# Patient Record
Sex: FEMALE | Race: WHITE | NOT HISPANIC OR LATINO | Employment: PART TIME | URBAN - METROPOLITAN AREA
[De-identification: names, ages, dates, MRNs, and addresses within clinical notes are randomized per-mention and may not be internally consistent; named-entity substitution may affect disease eponyms.]

---

## 2020-10-16 LAB — EXTERNAL CHLAMYDIA RESULT: NOT DETECTED

## 2021-11-19 ENCOUNTER — TELEPHONE (OUTPATIENT)
Dept: OBGYN CLINIC | Facility: CLINIC | Age: 26
End: 2021-11-19

## 2021-11-19 DIAGNOSIS — Z30.41 SURVEILLANCE FOR BIRTH CONTROL, ORAL CONTRACEPTIVES: Primary | ICD-10-CM

## 2021-11-21 RX ORDER — NORETHINDRONE ACETATE AND ETHINYL ESTRADIOL 1MG-20(21)
1 KIT ORAL DAILY
Qty: 90 TABLET | Refills: 0 | Status: SHIPPED | OUTPATIENT
Start: 2021-11-21 | End: 2021-11-22

## 2021-11-22 DIAGNOSIS — Z30.41 SURVEILLANCE FOR BIRTH CONTROL, ORAL CONTRACEPTIVES: ICD-10-CM

## 2021-11-22 RX ORDER — NORETHINDRONE ACETATE/ETHINYL ESTRADIOL AND FERROUS FUMARATE 1MG-20(24)
1 KIT ORAL DAILY
Qty: 90 TABLET | Refills: 1 | Status: SHIPPED | OUTPATIENT
Start: 2021-11-22 | End: 2022-01-24 | Stop reason: SDUPTHER

## 2021-12-15 ENCOUNTER — TELEPHONE (OUTPATIENT)
Dept: OBGYN CLINIC | Facility: CLINIC | Age: 26
End: 2021-12-15

## 2022-01-17 ENCOUNTER — VBI (OUTPATIENT)
Dept: ADMINISTRATIVE | Facility: OTHER | Age: 27
End: 2022-01-17

## 2022-01-18 NOTE — TELEPHONE ENCOUNTER
Upon review of the In Basket request we were able to locate, review, and update the patient chart as requested for Chlamydia and Pap Smear (HPV) aka Cervical Cancer Screening  Any additional questions or concerns should be emailed to the Practice Liaisons via [email or web address] email, please do not reply via In Basket      Thank you  Yanet Rogers

## 2022-01-24 ENCOUNTER — ANNUAL EXAM (OUTPATIENT)
Dept: OBGYN CLINIC | Facility: CLINIC | Age: 27
End: 2022-01-24
Payer: COMMERCIAL

## 2022-01-24 VITALS
BODY MASS INDEX: 36.18 KG/M2 | DIASTOLIC BLOOD PRESSURE: 60 MMHG | HEIGHT: 63 IN | WEIGHT: 204.2 LBS | SYSTOLIC BLOOD PRESSURE: 94 MMHG

## 2022-01-24 DIAGNOSIS — F32.A DEPRESSION, UNSPECIFIED DEPRESSION TYPE: ICD-10-CM

## 2022-01-24 DIAGNOSIS — Z30.41 SURVEILLANCE FOR BIRTH CONTROL, ORAL CONTRACEPTIVES: ICD-10-CM

## 2022-01-24 DIAGNOSIS — Z11.3 SCREEN FOR STD (SEXUALLY TRANSMITTED DISEASE): ICD-10-CM

## 2022-01-24 DIAGNOSIS — N80.9 ENDOMETRIOSIS: ICD-10-CM

## 2022-01-24 DIAGNOSIS — Z01.419 ENCOUNTER FOR GYNECOLOGICAL EXAMINATION WITHOUT ABNORMAL FINDING: Primary | ICD-10-CM

## 2022-01-24 DIAGNOSIS — Z12.4 CERVICAL CANCER SCREENING: ICD-10-CM

## 2022-01-24 DIAGNOSIS — Z30.09 CONTRACEPTIVE EDUCATION: ICD-10-CM

## 2022-01-24 PROCEDURE — S0612 ANNUAL GYNECOLOGICAL EXAMINA: HCPCS | Performed by: OBSTETRICS & GYNECOLOGY

## 2022-01-24 RX ORDER — LETROZOLE 2.5 MG/1
2.5 TABLET, FILM COATED ORAL DAILY
Qty: 90 TABLET | Refills: 3 | Status: SHIPPED | OUTPATIENT
Start: 2022-01-24 | End: 2023-01-24

## 2022-01-24 RX ORDER — ACETAMINOPHEN AND CODEINE PHOSPHATE 120; 12 MG/5ML; MG/5ML
SOLUTION ORAL DAILY
COMMUNITY

## 2022-01-24 RX ORDER — LETROZOLE 2.5 MG/1
TABLET, FILM COATED ORAL
COMMUNITY
Start: 2021-10-28 | End: 2022-01-24 | Stop reason: SDUPTHER

## 2022-01-24 RX ORDER — FLUTICASONE FUROATE 27.5 UG/1
2 SPRAY, METERED NASAL
COMMUNITY

## 2022-01-24 RX ORDER — NORETHINDRONE ACETATE/ETHINYL ESTRADIOL AND FERROUS FUMARATE 1MG-20(24)
1 KIT ORAL DAILY
Qty: 90 TABLET | Refills: 3 | Status: SHIPPED | OUTPATIENT
Start: 2022-01-24 | End: 2023-01-24

## 2022-01-24 RX ORDER — HYDROXYZINE PAMOATE 50 MG/1
50-100 CAPSULE ORAL
COMMUNITY

## 2022-01-24 RX ORDER — NORETHINDRONE ACETATE/ETHINYL ESTRADIOL AND FERROUS FUMARATE 1MG-20(24)
1 KIT ORAL DAILY
Qty: 90 TABLET | Refills: 3 | Status: SHIPPED | OUTPATIENT
Start: 2022-01-24 | End: 2022-01-24

## 2022-01-24 RX ORDER — SPIRONOLACTONE 100 MG/1
TABLET, FILM COATED ORAL
COMMUNITY
Start: 2021-11-04

## 2022-01-24 RX ORDER — LURASIDONE HYDROCHLORIDE 40 MG/1
TABLET, FILM COATED ORAL
COMMUNITY
Start: 2021-11-20

## 2022-01-24 NOTE — PROGRESS NOTES
[id number], [address], [facility], [address] [facility]    ASSESSMENT/PLAN: Elizabeth Shea is a 27 y o  Jose Headley who presents for annual gynecologic exam     Encounter for routine gynecologic examination  - Routine well woman exam completed today  - covid  +  reports     - STI screening offered including HIV testing: culture    - Contraceptive counseling discussed  Current contraception: condoms or combination OCPs:     Additional problems addressed during this visit:  1  Encounter for gynecological examination without abnormal finding    2  Surveillance for birth control, oral contraceptives    3  Endometriosis    4  Screen for STD (sexually transmitted disease)    5  Contraceptive education    6  Depression, unspecified depression type    26 yo  here for wellness exam  + hx of depression   Doing okay  Referred to  [facility] for  Counseling  Primary orders  Meds  No period only spots for  3-4  D  When stops  continuous pills  + hx  Of  Endometriosis  Uses latuda  Pain if does not  Denies ACHES  Pt to stop pill for  3-4  D for  BTB  No current partner  Encouraged wt loss and  Exercise   Busy at work and let's self go  Some pain w  Relations   CC:  Annual Gynecologic Examination    HPI: Elizabeth Shea is a 27 y o  Jose Headley who presents for annual gynecologic examination  26 yo here for  Wellness exam   + hx of  Depression  No feeling s of  Hurting  Self or others  Needs counselor    Referred to   [facility]    + hx of endometriosis   Manages   w  Letrozole and  OCP       The following portions of the patient's history were reviewed and updated as appropriate: She  has a past medical history of Depression and Endometriosis  She  has a past surgical history that includes Cholecystectomy (2018)    Her family history includes Breast cancer in her paternal grandmother; Diabetes in her father and maternal grandmother; Heart disease in her father; Hyperlipidemia in her father  She  reports that she has never smoked  She has never used smokeless tobacco  She reports current alcohol use  She reports current drug use  Frequency: 7 00 times per week  Drug: Marijuana  Current Outpatient Medications   Medication Sig Dispense Refill    fluticasone (Flonase Sensimist) 27 5 MCG/SPRAY nasal spray 2 sprays into each nostril      hydrOXYzine pamoate (VISTARIL) 50 mg capsule Take  mg by mouth      Latuda 40 MG tablet       letrozole (FEMARA) 2 5 mg tablet       norethindrone (MICRONOR) 0 35 MG tablet Take by mouth daily      norethindrone-ethinyl estradiol-ferrous fumarate (Jed 24 FE) 1-20 MG-MCG(24) per tablet Take 1 tablet by mouth daily 90 tablet 1    sertraline (ZOLOFT) 50 mg tablet       spironolactone (ALDACTONE) 100 mg tablet        No current facility-administered medications for this visit  She is allergic to ondansetron, bupropion, covid-19 (mrna) vaccine, doxycycline, iodinated diagnostic agents, lactose - food allergy, penicillins, covid-19 (adenovirus) vaccine, gadolinium, and latex       Review of Systems   Constitutional: Negative for chills and fever  HENT: Negative for ear pain and sore throat  Eyes: Negative for pain and visual disturbance  Respiratory: Negative for cough and shortness of breath  Cardiovascular: Negative for chest pain and palpitations  Gastrointestinal: Negative for abdominal pain and vomiting  Genitourinary: Positive for dyspareunia  Negative for difficulty urinating, dysuria, hematuria, menstrual problem, pelvic pain, vaginal bleeding, vaginal discharge and vaginal pain  Musculoskeletal: Negative for arthralgias and back pain  Skin: Negative for color change and rash  Neurological: Negative  Negative for seizures and syncope  Hematological: Negative  Psychiatric/Behavioral: Negative  All other systems reviewed and are negative          Objective:  BP 94/60 (BP Location: Left arm, Patient Position: Sitting, Cuff Size: Standard)   Ht 5' 3" (1 6 m)   Wt 92 6 kg (204 lb 3 2 oz)   LMP 10/01/2021 (Approximate) Comment: Does not take placebo week in her pack, but every three to four months, she still gets a period  BMI 36 17 kg/m²    Physical Exam  Vitals and nursing note reviewed  Constitutional:       Appearance: Normal appearance  HENT:      Head: Normocephalic  Cardiovascular:      Rate and Rhythm: Normal rate and regular rhythm  Pulses: Normal pulses  Heart sounds: Normal heart sounds  Pulmonary:      Effort: Pulmonary effort is normal       Breath sounds: Normal breath sounds  Chest:      Chest wall: No mass, lacerations, swelling, tenderness or edema  Breasts: Reji Score is 4  Breasts are symmetrical       Right: Normal  No swelling, bleeding, inverted nipple, mass, nipple discharge, skin change, tenderness, axillary adenopathy or supraclavicular adenopathy  Left: No swelling, bleeding, inverted nipple, mass, nipple discharge, skin change, tenderness, axillary adenopathy or supraclavicular adenopathy  Abdominal:      General: Abdomen is flat  Bowel sounds are normal       Palpations: Abdomen is soft  Genitourinary:     General: Normal vulva  Exam position: Lithotomy position  Pubic Area: No rash  Reji stage (genital): 4       Labia:         Right: No rash, tenderness or lesion  Left: No rash, tenderness or lesion  Urethra: No urethral pain, urethral swelling or urethral lesion  Vagina: Normal       Cervix: No cervical motion tenderness or discharge  Uterus: Normal        Adnexa: Right adnexa normal and left adnexa normal       Rectum: Normal    Musculoskeletal:         General: Normal range of motion  Cervical back: Neck supple  Lymphadenopathy:      Upper Body:      Right upper body: No supraclavicular, axillary or pectoral adenopathy        Left upper body: No supraclavicular, axillary or pectoral adenopathy  Lower Body: No right inguinal adenopathy  No left inguinal adenopathy  Skin:     General: Skin is warm and dry  Neurological:      General: No focal deficit present  Mental Status: She is alert and oriented to person, place, and time  Mental status is at baseline     Psychiatric:         Mood and Affect: Mood normal          Behavior: Behavior normal

## 2022-01-24 NOTE — PATIENT INSTRUCTIONS
Pap every 3 years if normal, STI testing as indicated, exercise most days of week, obtain appropriate diet and hydration, Calcium 1000mg + 600 vit D daily, birth control as directed (ACHES reviewed)  Benefits, risks and alternatives discussed/reviewed  Condom use when sexually active for sexually transmitted infection prevention  HPV 9 vaccine recommended through age 39  Check with your insurance for coverage  If covered, call office to schedule start of vaccine series  Annual mammogram starting at age 36, monthly breast self exam  Jose Miguel Moya   at red light or at least  20 times a day

## 2022-01-30 LAB
C TRACH RRNA CVX QL NAA+PROBE: NEGATIVE
CYTOLOGIST CVX/VAG CYTO: NORMAL
DX ICD CODE: NORMAL
N GONORRHOEA RRNA CVX QL NAA+PROBE: NEGATIVE
OTHER STN SPEC: NORMAL
OTHER STN SPEC: NORMAL
PATH REPORT.FINAL DX SPEC: NORMAL
SL AMB NOTE:: NORMAL
SL AMB SPECIMEN ADEQUACY: NORMAL
SL AMB TEST METHODOLOGY: NORMAL

## 2024-06-15 DIAGNOSIS — Z00.6 ENCOUNTER FOR EXAMINATION FOR NORMAL COMPARISON OR CONTROL IN CLINICAL RESEARCH PROGRAM: ICD-10-CM

## 2024-09-05 ENCOUNTER — TELEPHONE (OUTPATIENT)
Dept: CARDIOLOGY CLINIC | Facility: CLINIC | Age: 29
End: 2024-09-05

## 2024-09-05 ENCOUNTER — CONSULT (OUTPATIENT)
Dept: CARDIOLOGY CLINIC | Facility: CLINIC | Age: 29
End: 2024-09-05
Payer: COMMERCIAL

## 2024-09-05 VITALS
WEIGHT: 224 LBS | SYSTOLIC BLOOD PRESSURE: 114 MMHG | BODY MASS INDEX: 39.69 KG/M2 | RESPIRATION RATE: 16 BRPM | OXYGEN SATURATION: 99 % | HEIGHT: 63 IN | DIASTOLIC BLOOD PRESSURE: 74 MMHG | HEART RATE: 97 BPM

## 2024-09-05 DIAGNOSIS — R07.89 CHEST DISCOMFORT: Primary | ICD-10-CM

## 2024-09-05 DIAGNOSIS — R55 PRE-SYNCOPE: ICD-10-CM

## 2024-09-05 PROCEDURE — 99204 OFFICE O/P NEW MOD 45 MIN: CPT | Performed by: INTERNAL MEDICINE

## 2024-09-05 PROCEDURE — 93000 ELECTROCARDIOGRAM COMPLETE: CPT | Performed by: INTERNAL MEDICINE

## 2024-09-05 RX ORDER — LITHIUM CARBONATE 300 MG/1
300 CAPSULE ORAL
COMMUNITY

## 2024-09-05 RX ORDER — PRAZOSIN HYDROCHLORIDE 2 MG/1
2 CAPSULE ORAL
COMMUNITY

## 2024-09-05 NOTE — TELEPHONE ENCOUNTER
SPOKE TO PT'S INSUR CO & WE ARE IN PAR W/ PT'S PLAN & NO REFERRAL IS NEEDED  REF # OF THE CALL IS: [id number]

## 2024-09-05 NOTE — PROGRESS NOTES
Cardiology Consultation     Annika Henderson  [id number]  1995  [address] [facility]  [facility] [place] [address]  This patient presents for cardiac evaluation and consultation.  Patient was feeling well until her father  last year and subsequently she was dealing with the psychological aspects of the same.  Patient is followed by psychiatry.    Patient was evaluated by cardiologist in a different facility for possible POTS but no conclusive plan of treatment was formulated.    Patient does complain of dizzy lightheaded and becomes presyncopal.  No episodes of annamarie syncope in the recent past.  Patient has had syncope in the past.  Patient denies any history of cardiac problems in the past.  Patient denies any history of rheumatic fever or heart murmurs.  Patient has been on lithium as well as prazosin for nightmares.    Patient has been trying to keep herself hydrated and has liberal salt intake.  Patient does have some palpitations.  At times patient when she is standing for a long time becomes short of breath sweaty and lightheaded.      1. Chest discomfort  Echo complete w/ contrast if indicated    POCT ECG      2. Pre-syncope  Tilt table    POCT ECG          Past Medical History:   Diagnosis Date    Depression     Endometriosis      Social History     Socioeconomic History    Marital status: Single     Spouse name: Not on file    Number of children: Not on file    Years of education: Not on file    Highest education level: Not on file   Occupational History    Not on file   Tobacco Use    Smoking status: Never    Smokeless tobacco: Never   Vaping Use    Vaping status: Never Used   Substance and Sexual Activity    Alcohol use: Yes     Comment: occ    Drug use: Yes     Frequency: 7.0 times per week     Types: Marijuana     Comment: with medical card    Sexual activity: Yes     Birth control/protection:  "OCP, Pill     Comment: in the past year   Other Topics Concern    Not on file   Social History Narrative    Exercise: Never    Domestic violence: No    Current illegal drug use Yes         Social Determinants of Health     Financial Resource Strain: Not on file   Food Insecurity: Not on file   Transportation Needs: Not on file   Physical Activity: Not on file   Stress: Not on file   Social Connections: Not on file   Intimate Partner Violence: Not on file   Housing Stability: Not on file      Family History   Problem Relation Age of Onset    Diabetes Father     Heart disease Father     Hyperlipidemia Father     Diabetes Maternal Grandmother     Breast cancer Paternal Grandmother      Past Surgical History:   Procedure Laterality Date    CHOLECYSTECTOMY  2018       Current Outpatient Medications:     Blisovi 24 Fe 1-20 MG-MCG(24) per tablet, TAKE 1 TABLET BY MOUTH EVERY DAY, Disp: 84 tablet, Rfl: 3    CYPROHEPTADINE HCL PO, Take 1 tablet by mouth in the morning, Disp: , Rfl:     fluticasone (Flonase Sensimist) 27.5 MCG/SPRAY nasal spray, 2 sprays into each nostril, Disp: , Rfl:     lithium carbonate 300 mg capsule, Take 300 mg by mouth 3 (three) times a day with meals, Disp: , Rfl:     prazosin (MINIPRESS) 2 mg capsule, Take 2 mg by mouth daily at bedtime, Disp: , Rfl:   Allergies   Allergen Reactions    Ondansetron Anaphylaxis    Bupropion Seizures     Other reaction(s): seizure      Covid-19 (Mrna) Vaccine Hives    Doxycycline Other (See Comments)     Loss of vision      Iodinated Contrast Media Other (See Comments)    Lactose - Food Allergy GI Intolerance    Penicillins Other (See Comments)     Mom is allergic      Covid-19 (Adenovirus) Vaccine Other (See Comments)    Gadolinium Other (See Comments)    Latex Rash     Vitals:    09/05/24 1352   BP: 114/74   BP Location: Left arm   Patient Position: Sitting   Cuff Size: Large   Pulse: 97   Resp: 16   SpO2: 99%   Weight: 102 kg (224 lb)   Height: 5' 3\" (1.6 m) "       Labs:  No visits with results within 2 Month(s) from this visit.   Latest known visit with results is:   Annual Exam on 01/24/2022   Component Date Value    Diagnosis: 01/24/2022 Comment     Specimen Adequacy 01/24/2022 Comment     Clinician Provided ICD10 01/24/2022 Comment     Performed by: 01/24/2022 Comment     KIERSTEN FINNEY . 01/24/2022 .     Note: 01/24/2022 Comment     Test Methodology: 01/24/2022 Comment     KIERSTEN AMB . 01/24/2022 Comment     Chlamydia, Nuc. Acid Amp 01/24/2022 Negative     Gonococcus bY Nucleic Ac* 01/24/2022 Negative      Imaging: No results found.    Review of Systems:  Review of Systems  REVIEW OF SYSTEMS:  Constitutional:  Denies fever or chills   Eyes:  Denies change in visual acuity   HENT:  Denies nasal congestion or sore throat   Respiratory:  shortness of breath   Cardiovascular:  Denies chest pain or edema   GI:  Denies abdominal pain, nausea, vomiting, bloody stools or diarrhea   :  Denies dysuria, frequency, difficulty in micturition and nocturia  Musculoskeletal:  Denies back pain or joint pain   Neurologic:  Denies headache, focal weakness or sensory changes   Endocrine:  Denies polyuria or polydipsia   Lymphatic:  Denies swollen glands       Physical Exam:  Physical Exam  PHYSICAL EXAM:  General:  Patient is not in acute distress   Head: Normocephalic, Atraumatic.  HEENT:  Both pupils normal-size atraumatic, normocephalic, nonicteric  Neck:  JVP not raised. Trachea central. No carotid bruit  Respiratory:  normal breath sounds no crackles. no rhonchi  Cardiovascular:  Regular rate and rhythm no S3 no murmurs  GI:  Abdomen soft nontender. No organomegaly.   Lymphatic:  No cervical or inguinal lymphadenopathy  Neurologic:  Patient is awake alert, oriented . Grossly nonfocal  Extremities no edema    EKG shows sinus rhythm and possible left atrial enlargement    Discussion/Summary:    This patient has symptoms of orthostasis and presyncope.  Patient concerned about POTS.    Lengthy  discussion with patient regarding her symptomatology and etiology and pathogenesis of orthostasis.    Importance of adequate hydration, liberal salt intake as well as using compression stocking discussed with patient.    Orthostatics reviewed  Lying /70 heart rate 92  Sitting /74 heart rate 110  Standing BP 92/68 heart rate 107    Patient will be scheduled for tilt table test for further evaluation.  Patient states that she had a Holter monitor as well as EKG and stress test through her previous cardiologist.    Patient will be scheduled for an echocardiogram to assess ejection fraction and rule out evidence of structural heart disease.    Possible treatment options for her symptoms were considered but at this time we will try nonpharmacological measures and wait for tilt table test and echocardiogram results.    Patient also encouraged to check with her psychiatrist regarding use of prazosin which is a vasodilator and can cause orthostasis and low blood pressures.    Follow-up in 6 months or earlier as needed.  Patient is agreeable with the plan of care.

## 2024-09-09 ENCOUNTER — APPOINTMENT (OUTPATIENT)
Dept: LAB | Facility: HOSPITAL | Age: 29
End: 2024-09-09
Payer: COMMERCIAL

## 2024-09-09 DIAGNOSIS — Z00.6 ENCOUNTER FOR EXAMINATION FOR NORMAL COMPARISON OR CONTROL IN CLINICAL RESEARCH PROGRAM: ICD-10-CM

## 2024-09-09 PROCEDURE — 36415 COLL VENOUS BLD VENIPUNCTURE: CPT

## 2024-09-20 ENCOUNTER — HOSPITAL ENCOUNTER (OUTPATIENT)
Dept: NON INVASIVE DIAGNOSTICS | Facility: HOSPITAL | Age: 29
Discharge: HOME | End: 2024-09-20
Payer: COMMERCIAL

## 2024-09-20 VITALS
DIASTOLIC BLOOD PRESSURE: 74 MMHG | HEART RATE: 75 BPM | HEIGHT: 63 IN | WEIGHT: 224 LBS | BODY MASS INDEX: 39.69 KG/M2 | SYSTOLIC BLOOD PRESSURE: 114 MMHG

## 2024-09-20 DIAGNOSIS — R07.89 CHEST DISCOMFORT: ICD-10-CM

## 2024-09-20 LAB
AORTIC ROOT: 3.1 CM
APICAL FOUR CHAMBER EJECTION FRACTION: 62 %
ASCENDING AORTA: 2.5 CM
BSA FOR ECHO PROCEDURE: 2.03 M2
E WAVE DECELERATION TIME: 161 MS
E/A RATIO: 1.3
FRACTIONAL SHORTENING: 32 (ref 28–44)
INTERVENTRICULAR SEPTUM IN DIASTOLE (PARASTERNAL SHORT AXIS VIEW): 0.8 CM
INTERVENTRICULAR SEPTUM: 0.8 CM (ref 0.6–1.1)
LAAS-AP2: 10.2 CM2
LAAS-AP4: 12.8 CM2
LEFT ATRIUM SIZE: 3 CM
LEFT ATRIUM VOLUME (MOD BIPLANE): 26 ML
LEFT ATRIUM VOLUME INDEX (MOD BIPLANE): 12.8 ML/M2
LEFT INTERNAL DIMENSION IN SYSTOLE: 2.6 CM (ref 2.1–4)
LEFT VENTRICLE DIASTOLIC VOLUME (MOD BIPLANE): 141 ML
LEFT VENTRICLE DIASTOLIC VOLUME INDEX (MOD BIPLANE): 69.5 ML/M2
LEFT VENTRICLE SYSTOLIC VOLUME (MOD BIPLANE): 51 ML
LEFT VENTRICLE SYSTOLIC VOLUME INDEX (MOD BIPLANE): 25.1 ML/M2
LEFT VENTRICULAR INTERNAL DIMENSION IN DIASTOLE: 3.8 CM (ref 3.5–6)
LEFT VENTRICULAR POSTERIOR WALL IN END DIASTOLE: 0.9 CM
LEFT VENTRICULAR STROKE VOLUME: 36 ML
LV EF: 64 %
LVSV (TEICH): 36 ML
MV E'TISSUE VEL-LAT: 19 CM/S
MV E'TISSUE VEL-SEP: 15 CM/S
MV PEAK A VEL: 0.69 M/S
MV PEAK E VEL: 90 CM/S
MV STENOSIS PRESSURE HALF TIME: 47 MS
MV VALVE AREA P 1/2 METHOD: 4.68
RIGHT ATRIUM AREA SYSTOLE A4C: 10.8 CM2
RIGHT VENTRICLE ID DIMENSION: 3.3 CM
SL CV LEFT ATRIUM LENGTH A2C: 4 CM
SL CV LV EF: 65
SL CV PED ECHO LEFT VENTRICLE DIASTOLIC VOLUME (MOD BIPLANE) 2D: 60 ML
SL CV PED ECHO LEFT VENTRICLE SYSTOLIC VOLUME (MOD BIPLANE) 2D: 24 ML
TR MAX PG: 25 MMHG
TR PEAK VELOCITY: 2.5 M/S
TRICUSPID ANNULAR PLANE SYSTOLIC EXCURSION: 1.9 CM
TRICUSPID VALVE PEAK REGURGITATION VELOCITY: 2.49 M/S

## 2024-09-20 PROCEDURE — 93306 TTE W/DOPPLER COMPLETE: CPT

## 2024-09-20 PROCEDURE — 93306 TTE W/DOPPLER COMPLETE: CPT | Performed by: INTERNAL MEDICINE

## 2024-09-24 ENCOUNTER — TELEPHONE (OUTPATIENT)
Dept: CARDIOLOGY CLINIC | Facility: CLINIC | Age: 29
End: 2024-09-24

## 2024-09-24 LAB
APOB+LDLR+PCSK9 GENE MUT ANL BLD/T: NOT DETECTED
BRCA1+BRCA2 DEL+DUP + FULL MUT ANL BLD/T: NOT DETECTED
MLH1+MSH2+MSH6+PMS2 GN DEL+DUP+FUL M: NOT DETECTED

## 2024-09-24 NOTE — TELEPHONE ENCOUNTER
----- Message from Fina Carrion MD sent at 9/23/2024  9:18 AM EDT -----  Please call the patient.  Echocardiogram shows normal ejection fraction with no significant valvular abnormalities.

## 2024-11-27 ENCOUNTER — HOSPITAL ENCOUNTER (OUTPATIENT)
Dept: NON INVASIVE DIAGNOSTICS | Facility: HOSPITAL | Age: 29
Discharge: HOME | End: 2024-11-27

## 2024-11-27 DIAGNOSIS — R55 PRE-SYNCOPE: ICD-10-CM

## 2024-11-27 LAB
CHEST PAIN STATEMENT: NORMAL
MAX DIASTOLIC BP: 90 MMHG
MAX PREDICTED HEART RATE: 193 BPM
PROTOCOL NAME: NORMAL
REASON FOR TERMINATION: NORMAL
STRESS POST EXERCISE DUR MIN: 45 MIN
STRESS POST EXERCISE DUR SEC: 0 SEC
STRESS POST PEAK HR: 110 BPM
STRESS POST PEAK SYSTOLIC BP: 122 MMHG
TARGET HR FORMULA: NORMAL
TEST INDICATION: NORMAL

## 2024-11-27 PROCEDURE — 93660 TILT TABLE EVALUATION: CPT

## 2024-11-27 PROCEDURE — 93660 TILT TABLE EVALUATION: CPT | Performed by: INTERNAL MEDICINE

## 2024-12-09 ENCOUNTER — RESULTS FOLLOW-UP (OUTPATIENT)
Dept: CARDIOLOGY CLINIC | Facility: CLINIC | Age: 29
End: 2024-12-09